# Patient Record
Sex: FEMALE | Race: BLACK OR AFRICAN AMERICAN | URBAN - METROPOLITAN AREA
[De-identification: names, ages, dates, MRNs, and addresses within clinical notes are randomized per-mention and may not be internally consistent; named-entity substitution may affect disease eponyms.]

---

## 2018-02-09 ENCOUNTER — OFFICE VISIT (OUTPATIENT)
Dept: INTERNAL MEDICINE CLINIC | Age: 43
End: 2018-02-09

## 2018-02-09 VITALS
WEIGHT: 194 LBS | TEMPERATURE: 98.1 F | BODY MASS INDEX: 32.32 KG/M2 | RESPIRATION RATE: 16 BRPM | SYSTOLIC BLOOD PRESSURE: 122 MMHG | HEIGHT: 65 IN | DIASTOLIC BLOOD PRESSURE: 86 MMHG

## 2018-02-09 DIAGNOSIS — R39.9 UTI SYMPTOMS: Primary | ICD-10-CM

## 2018-02-09 DIAGNOSIS — Z09 FOLLOW UP: ICD-10-CM

## 2018-02-09 LAB
BILIRUBIN, POC: NORMAL
BLOOD URINE, POC: NORMAL
CLARITY, POC: CLEAR
COLOR, POC: YELLOW
GLUCOSE URINE, POC: NORMAL
KETONES, POC: NORMAL
LEUKOCYTE EST, POC: NORMAL
NITRITE, POC: NORMAL
PH, POC: 6
PROTEIN, POC: 30
SPECIFIC GRAVITY, POC: 1.02
UROBILINOGEN, POC: 0.2

## 2018-02-09 PROCEDURE — 81002 URINALYSIS NONAUTO W/O SCOPE: CPT | Performed by: NURSE PRACTITIONER

## 2018-02-09 PROCEDURE — 99213 OFFICE O/P EST LOW 20 MIN: CPT | Performed by: NURSE PRACTITIONER

## 2018-02-09 ASSESSMENT — ENCOUNTER SYMPTOMS
BACK PAIN: 0
PHOTOPHOBIA: 0
EYE PAIN: 0
VOMITING: 0
SHORTNESS OF BREATH: 0
CONSTIPATION: 0
COUGH: 0
CHEST TIGHTNESS: 0
TROUBLE SWALLOWING: 0
ABDOMINAL PAIN: 0
NAUSEA: 0
SORE THROAT: 0
WHEEZING: 0
DIARRHEA: 0

## 2018-02-09 NOTE — PROGRESS NOTES
133*    Potassium 01/30/2018 3.5     Chloride 01/30/2018 95*    CO2 01/30/2018 25     Anion Gap 01/30/2018 13     Glucose 01/30/2018 108*    BUN 01/30/2018 7     CREATININE 01/30/2018 0.7     GFR Non- 01/30/2018 >60     GFR  01/30/2018 >60     Calcium 01/30/2018 8.8     Total Protein 01/30/2018 7.9     Alb 01/30/2018 3.9     Albumin/Globulin Ratio 01/30/2018 1.0*    Total Bilirubin 01/30/2018 <0.2     Alkaline Phosphatase 01/30/2018 66     ALT 01/30/2018 12     AST 01/30/2018 25     Globulin 01/30/2018 4.0     Lipase 01/30/2018 15.0     Troponin 01/30/2018 <0.01     Ventricular Rate 02/06/2018 88     Atrial Rate 02/06/2018 88     P-R Interval 02/06/2018 136     QRS Duration 02/06/2018 84     Q-T Interval 02/06/2018 376     QTc Calculation (Bazett) 02/06/2018 454     P Axis 02/06/2018 38     R Axis 02/06/2018 36     T Axis 02/06/2018 35     Diagnosis 02/06/2018                      Value:Normal sinus rhythm  Baseline artifact  Nonspecific T wave abnormality  No previous ECGs available  Confirmed by [facility] Fletcher PALMA MD ([id number]) on 1/31/2018 12:11:10 PM      Rapid Influenza A Ag 01/30/2018 Negative     Rapid Influenza B Ag 01/30/2018 POSITIVE*    hCG Quant 01/30/2018 <5.0          Physical Exam   Constitutional: She is oriented to person, place, and time. She appears well-developed and well-nourished. HENT:   Head: Normocephalic. Eyes: Pupils are equal, round, and reactive to light. Right eye exhibits no discharge. Neck: Normal range of motion. No JVD present. No tracheal deviation present. No thyromegaly present. Cardiovascular: Normal rate and regular rhythm. No murmur heard. Pulmonary/Chest: Breath sounds normal. No respiratory distress. She has no wheezes. She has no rales. She exhibits no tenderness. Abdominal: Soft. Bowel sounds are normal. She exhibits no distension and no mass. There is no tenderness.  There is no rebound and no

## 2020-06-16 ENCOUNTER — OFFICE VISIT (OUTPATIENT)
Dept: PRIMARY CARE CLINIC | Age: 45
End: 2020-06-16

## 2020-06-16 VITALS — TEMPERATURE: 98.3 F | OXYGEN SATURATION: 98 % | HEART RATE: 71 BPM

## 2020-06-16 PROCEDURE — 99211 OFF/OP EST MAY X REQ PHY/QHP: CPT | Performed by: INTERNAL MEDICINE

## 2020-06-19 LAB
SARS-COV-2: NOT DETECTED
SOURCE: NORMAL